# Patient Record
Sex: FEMALE | Race: WHITE | Employment: FULL TIME | URBAN - METROPOLITAN AREA
[De-identification: names, ages, dates, MRNs, and addresses within clinical notes are randomized per-mention and may not be internally consistent; named-entity substitution may affect disease eponyms.]

---

## 2021-08-28 ENCOUNTER — HOSPITAL ENCOUNTER (EMERGENCY)
Age: 21
Discharge: PSYCHIATRIC OR SUBSTANCE USE FACILITY | End: 2021-08-29
Attending: EMERGENCY MEDICINE
Payer: COMMERCIAL

## 2021-08-28 VITALS
HEART RATE: 78 BPM | RESPIRATION RATE: 14 BRPM | WEIGHT: 165 LBS | DIASTOLIC BLOOD PRESSURE: 72 MMHG | SYSTOLIC BLOOD PRESSURE: 113 MMHG | OXYGEN SATURATION: 99 % | TEMPERATURE: 98.2 F

## 2021-08-28 DIAGNOSIS — R45.851 SUICIDAL IDEATION: Primary | ICD-10-CM

## 2021-08-28 LAB
A/G RATIO: 1.4 (ref 1.1–2.2)
ACETAMINOPHEN LEVEL: <5 UG/ML (ref 10–30)
ALBUMIN SERPL-MCNC: 4.8 G/DL (ref 3.4–5)
ALP BLD-CCNC: 98 U/L (ref 40–129)
ALT SERPL-CCNC: 9 U/L (ref 10–40)
AMORPHOUS: ABNORMAL /HPF
AMPHETAMINE SCREEN, URINE: ABNORMAL
ANION GAP SERPL CALCULATED.3IONS-SCNC: 12 MMOL/L (ref 3–16)
AST SERPL-CCNC: 15 U/L (ref 15–37)
BACTERIA: ABNORMAL /HPF
BARBITURATE SCREEN URINE: ABNORMAL
BASOPHILS ABSOLUTE: 0 K/UL (ref 0–0.2)
BASOPHILS RELATIVE PERCENT: 0.4 %
BENZODIAZEPINE SCREEN, URINE: ABNORMAL
BILIRUB SERPL-MCNC: 0.4 MG/DL (ref 0–1)
BILIRUBIN URINE: ABNORMAL
BLOOD, URINE: NEGATIVE
BUN BLDV-MCNC: 11 MG/DL (ref 7–20)
CALCIUM SERPL-MCNC: 9.9 MG/DL (ref 8.3–10.6)
CANNABINOID SCREEN URINE: POSITIVE
CHLORIDE BLD-SCNC: 101 MMOL/L (ref 99–110)
CLARITY: CLEAR
CO2: 23 MMOL/L (ref 21–32)
COCAINE METABOLITE SCREEN URINE: ABNORMAL
COLOR: YELLOW
CREAT SERPL-MCNC: 0.8 MG/DL (ref 0.6–1.1)
EOSINOPHILS ABSOLUTE: 0.1 K/UL (ref 0–0.6)
EOSINOPHILS RELATIVE PERCENT: 1 %
EPITHELIAL CELLS, UA: ABNORMAL /HPF (ref 0–5)
ETHANOL: NORMAL MG/DL (ref 0–0.08)
GFR AFRICAN AMERICAN: >60
GFR NON-AFRICAN AMERICAN: >60
GLOBULIN: 3.4 G/DL
GLUCOSE BLD-MCNC: 90 MG/DL (ref 70–99)
GLUCOSE URINE: NEGATIVE MG/DL
HCG QUALITATIVE: NEGATIVE
HCT VFR BLD CALC: 42.2 % (ref 36–48)
HEMOGLOBIN: 14.3 G/DL (ref 12–16)
KETONES, URINE: ABNORMAL MG/DL
LEUKOCYTE ESTERASE, URINE: ABNORMAL
LYMPHOCYTES ABSOLUTE: 1.6 K/UL (ref 1–5.1)
LYMPHOCYTES RELATIVE PERCENT: 17.7 %
Lab: ABNORMAL
MCH RBC QN AUTO: 29.4 PG (ref 26–34)
MCHC RBC AUTO-ENTMCNC: 33.8 G/DL (ref 31–36)
MCV RBC AUTO: 86.8 FL (ref 80–100)
METHADONE SCREEN, URINE: ABNORMAL
MICROSCOPIC EXAMINATION: YES
MONOCYTES ABSOLUTE: 0.5 K/UL (ref 0–1.3)
MONOCYTES RELATIVE PERCENT: 5.2 %
NEUTROPHILS ABSOLUTE: 6.6 K/UL (ref 1.7–7.7)
NEUTROPHILS RELATIVE PERCENT: 75.7 %
NITRITE, URINE: NEGATIVE
OPIATE SCREEN URINE: ABNORMAL
OXYCODONE URINE: ABNORMAL
PDW BLD-RTO: 13 % (ref 12.4–15.4)
PH UA: 8
PH UA: 8 (ref 5–8)
PHENCYCLIDINE SCREEN URINE: ABNORMAL
PLATELET # BLD: 309 K/UL (ref 135–450)
PMV BLD AUTO: 8.5 FL (ref 5–10.5)
POTASSIUM REFLEX MAGNESIUM: 3.7 MMOL/L (ref 3.5–5.1)
PROPOXYPHENE SCREEN: ABNORMAL
PROTEIN UA: NEGATIVE MG/DL
RBC # BLD: 4.86 M/UL (ref 4–5.2)
RBC UA: ABNORMAL /HPF (ref 0–4)
SALICYLATE, SERUM: <0.3 MG/DL (ref 15–30)
SARS-COV-2, NAAT: NOT DETECTED
SODIUM BLD-SCNC: 136 MMOL/L (ref 136–145)
SPECIFIC GRAVITY UA: 1.02 (ref 1–1.03)
TOTAL PROTEIN: 8.2 G/DL (ref 6.4–8.2)
URINE TYPE: ABNORMAL
UROBILINOGEN, URINE: 2 E.U./DL
WBC # BLD: 8.8 K/UL (ref 4–11)
WBC UA: ABNORMAL /HPF (ref 0–5)

## 2021-08-28 PROCEDURE — 80307 DRUG TEST PRSMV CHEM ANLYZR: CPT

## 2021-08-28 PROCEDURE — 6370000000 HC RX 637 (ALT 250 FOR IP): Performed by: PHYSICIAN ASSISTANT

## 2021-08-28 PROCEDURE — 81001 URINALYSIS AUTO W/SCOPE: CPT

## 2021-08-28 PROCEDURE — 84703 CHORIONIC GONADOTROPIN ASSAY: CPT

## 2021-08-28 PROCEDURE — 80143 DRUG ASSAY ACETAMINOPHEN: CPT

## 2021-08-28 PROCEDURE — 80179 DRUG ASSAY SALICYLATE: CPT

## 2021-08-28 PROCEDURE — 80053 COMPREHEN METABOLIC PANEL: CPT

## 2021-08-28 PROCEDURE — 82077 ASSAY SPEC XCP UR&BREATH IA: CPT

## 2021-08-28 PROCEDURE — 99285 EMERGENCY DEPT VISIT HI MDM: CPT

## 2021-08-28 PROCEDURE — 85025 COMPLETE CBC W/AUTO DIFF WBC: CPT

## 2021-08-28 PROCEDURE — 87635 SARS-COV-2 COVID-19 AMP PRB: CPT

## 2021-08-28 RX ORDER — NICOTINE 21 MG/24HR
1 PATCH, TRANSDERMAL 24 HOURS TRANSDERMAL DAILY
Status: DISCONTINUED | OUTPATIENT
Start: 2021-08-28 | End: 2021-08-29 | Stop reason: HOSPADM

## 2021-08-28 RX ORDER — NICOTINE 21 MG/24HR
1 PATCH, TRANSDERMAL 24 HOURS TRANSDERMAL DAILY
Status: DISCONTINUED | OUTPATIENT
Start: 2021-08-29 | End: 2021-08-28

## 2021-08-28 NOTE — ED NOTES
Presenting Problem:Patient presents to the ED at [facility] voluntarily after having thoughts of overdosing on sleeping pills. Patient was clinically sober at the time of the evaluation. Patient states she was medically discharged from the service in 12/2020 for a hip fracture. She returned home to [place] where her parents are living. A few months ago she moved back to this area, where her friends are. Patient states she stopped going to therapy about a year ago because she did not feel it was going any good. She states she continues to take her medication that is prescribed from a doctor in [place]. She state recently she started having suicidal thoughts. Last night the thoughts where very intense and she considered taking an overdose of over the counter sleeping pills. She states she does not feel safe at home, she is afraid she may do something, which is why she had her friend bring her to the hospital.  She states she does have a relationship with her parents but it is strained because they do not agree with her sexuality. She states she feels numb. She feels as though everything is her fault and she cant do anything right. \"Whatever I do it isn't good enough, everything and everybody would be better off with out me. \"      Appearance/Hygiene:  hospital attire, fair grooming and fair hygiene   Motor Behavior: WNL   Attitude: cooperative  Affect: flat affect   Speech: normal volume  Mood: sad   Thought Processes: [place]  Perceptions: Absent   Thought content: Clear   Orientation: A&Ox4   Memory: intact  Concentration: Fair    Insight/ judgement: impaired judgment and insight    Psychosocial and contextual factors: Pt recently out of the service in 12/2020 for fractured hip. Lives alone. C-SSRS flowsheet is  Complete. Psychiatric History (including current outpatient provider and past inpatient admissions): Yes. Has not seen anyone in about a year.   She did not feel as though it was doing any good. States that she continues to take her medications.      Access to Firearms: Knives    ASSESSMENT FOR IMMINENT FUTURE DANGER:    RISK FACTORS:    [x]  Age <25 or >49   []  Male gender   [x]  Depressed mood   [x]  Active suicidal ideation   [x]  Suicide plan   []  Suicide attempt   []  Access to lethal means   []  Prior suicide attempt   []  Active substance abuse    [x]  Highly impulsive behaviors   []  Not attending to self-care/ADLs    []  Recent significant loss   []  Chronic pain or medical illness   [x]  Social isolation   []  History of violence    []  Active psychosis   []  Cognitive impairment    [x]  No outpatient services in place   []  Medication noncompliance   [x]  No collateral information to support safety  [x] Self- injurious/ Self-harm behavior    PROTECTIVE FACTORS:  [] Age >25 and <55  [x] Female gender   [] Denies depression  [] Denies suicidal ideation  [] Does not have lethal plan   [] Does not have access to guns or weapons  [] Patient is verbally queenie for safety  [x] No prior suicide attempts  [] No active substance abuse  [] Patient has social or family support  [] No active psychosis or cognitive dysfunction  [x] Physically healthy  [] Has outpatient services in place  [] Compliant with recommended medications           Radha Lorenzo RN  08/28/21 7116

## 2021-08-28 NOTE — ED NOTES
Collateral Contact:  Name: Koki Rios  Relation to Patient: Friend  Last Contact with Patient: This morning  Concerns:     July Sorto states that she has known the patient for about 2 years. All she knows is what the patient has told her. That the patient is not in a good place right now. The patient has a history of self harm behavior. She does see her a couple times a week. They used to work together and she is friends with the patients GF. July Sorto does know that the relationship between the patient and her parents is not good because of the patients sexuality. July Sorto does not know if the patient would really harm herself or not.          Rodolfo Lebron RN  08/28/21 1683

## 2021-08-28 NOTE — ED NOTES
Patient belonging:shirt. shorts,and bra,scok and shoes. Wallet with 5 cards and id.  Cell phone     María Chris  08/28/21 4853

## 2021-08-28 NOTE — ED PROVIDER NOTES
[facility] Emergency Department    CHIEF COMPLAINT  Suicidal (pt sts \" i feel i am a threat to myself\" started a week ago)      [address] [facility]  Patient was evaluated by myself as well as the attending physician Dr. Ian Coffey. HISTORY OF PRESENT ILLNESS  Jeronimo West is a 22 y.o. female history of depression anxiety presents emergency department for evaluation of concerns for for being a threat to herself. Patient has a long history of depression anxiety and takes citalopram.  She has been seen by therapy in the past however has not been seen in over a year. Reports over the past week her anxiety depression has been increasing and she has lost interest in daily activities. Patient states that she lives alone. Patient states that she has thought of harm herself if she would do so she would take a large amount of sleeping pills. No prior suicide attempts in the past.  Has not attempted at this time. Denies any prior inpatient psychiatric evaluation. Denies any medical conditions aside from eczema. Denies drug or alcohol use. Admits to vaping. No other complaints, modifying factors or associated symptoms. Nursing notes reviewed. History reviewed. No pertinent past medical history. History reviewed. No pertinent surgical history. History reviewed. No pertinent family history.   Social History     Socioeconomic History    Marital status: Single     Spouse name: Not on file    Number of children: Not on file    Years of education: Not on file    Highest education level: Not on file   Occupational History    Not on file   Tobacco Use    Smoking status: Never Smoker    Smokeless tobacco: Never Used   Vaping Use    Vaping Use: Every day    Substances: Nicotine   Substance and Sexual Activity    Alcohol use: Not on file    Drug use: Not on file    Sexual activity: Not on file   Other Topics Concern    Not on file   Social History Narrative    Not on file Social Determinants of Health     Financial Resource Strain:     Difficulty of Paying Living Expenses:    Food Insecurity:     Worried About Running Out of Food in the Last Year:     [address] in the Last Year:    Transportation Needs:     Lack of Transportation (Medical):  Lack of Transportation (Non-Medical):    Physical Activity:     Days of Exercise per Week:     Minutes of Exercise per Session:    Stress:     Feeling of Stress :    Social Connections:     Frequency of Communication with Friends and Family:     Frequency of Social Gatherings with Friends and Family:     Attends Zoroastrian Services:     Active Member of Clubs or Organizations:     Attends Club or Organization Meetings:     Marital Status:    Intimate Partner Violence:     Fear of Current or Ex-Partner:     Emotionally Abused:     Physically Abused:     Sexually Abused:      Current Facility-Administered Medications   Medication Dose Route Frequency Provider Last Rate Last Admin    nicotine (NICODERM CQ) 21 MG/24HR 1 patch  1 patch Transdermal Daily Amilcar Logan PA-C   1 patch at 08/28/21 2234     No current outpatient medications on file. Allergies   Allergen Reactions    Pcn [Penicillins]        REVIEW OF SYSTEMS  10 systems reviewed, pertinent positives per HPI otherwise noted to be negative    PHYSICAL EXAM  /72   Pulse 78   Temp 98.2 °F (36.8 °C) (Oral)   Resp 14   Wt 165 lb (74.8 kg)   LMP 08/05/2021   SpO2 99%   GENERAL APPEARANCE: Awake and alert. Cooperative. HEAD: Normocephalic. Atraumatic. EYES: PERRL. EOM's grossly intact. ENT: Mucous membranes are moist.   NECK: Supple. HEART: RRR. No murmurs. LUNGS: Respirations unlabored. CTAB. Good air exchange. Speaking comfortably in full sentences. ABDOMEN: Soft. Non-distended. Non-tender. No guarding or rebound. No masses. No organomegaly. EXTREMITIES: No peripheral edema. Moves all extremities equally.  All extremities neurovascularly intact. SKIN: Warm and dry. No acute rashes. NEUROLOGICAL: Alert and oriented. CN's 2-12 intact. No gross facial drooping. Strength 5/5, sensation intact. PSYCHIATRIC: Normal mood and affect.     RADIOLOGY      LABS  Labs Reviewed   COMPREHENSIVE METABOLIC PANEL W/ REFLEX TO MG FOR LOW K - Abnormal; Notable for the following components:       Result Value    ALT 9 (*)     All other components within normal limits    Narrative:     Performed at:  [address], [facility]   Phone [phone] LEVEL - Abnormal; Notable for the following components:    Acetaminophen Level <5 (*)     All other components within normal limits    Narrative:     Performed at:  [place] [address] [facility]   Phone [phone]   SALICYLATE LEVEL - Abnormal; Notable for the following components:    Salicylate, Serum <2.1 (*)     All other components within normal limits    Narrative:     Performed at:  [address], [facility]   Phone [phone]   [address] - Abnormal; Notable for the following components:    Cannabinoid Scrn, Ur POSITIVE (*)     All other components within normal limits    Narrative:     Performed at:  [address], [facility]   Phone [phone] - Abnormal; Notable for the following components:    Bilirubin Urine SMALL (*)     Ketones, Urine TRACE (*)     Urobilinogen, Urine 2.0 (*)     Leukocyte Esterase, Urine TRACE (*)     All other components within normal limits    Narrative:     Performed at:  [place] [address] [facility]   Phone [phone]   MICROSCOPIC URINALYSIS - Abnormal; Notable for the following components:    WBC, UA 6-9 (*)     Bacteria, UA Rare (*) All other components within normal limits    Narrative:     Performed at:  [address], [facility] [place]   Phone [phone], RAPID    Narrative:     Performed at:  [address], [facility] [place]   Phone [phone]   CBC WITH AUTO DIFFERENTIAL    Narrative:     Performed at:  [address], [facility] [place]   Phone [phone]   ETHANOL    Narrative:     Performed at:  [address], [facility] [place]   Phone [phone]   HCG, SERUM, QUALITATIVE    Narrative:     Performed at:  [address], [facility] [place]   Phone [phone]       PROCEDURES  Unless otherwise noted below, none  Procedures    ED COURSE    ED Course as of Aug 29 0100   Sat Aug 28, 2021   1548 Patient medically cleared ready for telepsych evaluation. [MM]      ED Course User Index  [MM] Narinder Cruz PA-C       CRITICAL CARE TIME      MDM  MDM  31-year-old female history anxiety depression presents emergency department for evaluation of thoughts concerns of being a threat against herself. Currently takes antidepressants. No current established psychiatric or therapy. No prior history of inpatient evaluations. No prior suicide attempts. Patient states she has considered taking sleeping pills as a way of overdose. No attempts at this time. On arrival vitals normal limits. Patient is alert oriented cooperative and does not appear to be intoxicated. Denies history of drug or alcohol abuse. At this time patient appears medically cleared we will reach out to behavioral health for telemetry psych evaluation to discuss plan for disposition.     Patient was evaluated by behavioral health and per the recommendation a 72-hour hold signed and patient will be transferred to [facility] behavioral health for inpatient evaluation. DISPOSITION  Transfer to [facility] behavioral health for inpatient evaluation and management. CLINICAL IMPRESSION  1.  Suicidal ideation           Carlita Sen PA-C  08/29/21 0106

## 2021-08-28 NOTE — ED NOTES
Level of Care Disposition:  Admit    Patient was seen by ED provider and [facility] staff. The case presented to psychiatric provider on-call Dr. Katy Donald. Based on the ED evaluation and information presented to the provider by Sheron Feliciano,  it is the recommendation of the on call psychiatric provider that inpatient hospitalization is the least restrictive environment for the patient at this time. The patient will be admitted to the inpatient unit. Admitting provider did not order suicide precautions based on the unit is a safe and secure environment. RATIONALE FOR ADMISSION:   Patient at imminent risk of danger to self as demonstrated by her desire to commit suicide with plan.                 Teresa Puente RN  08/28/21 2635

## 2021-08-29 PROCEDURE — 6370000000 HC RX 637 (ALT 250 FOR IP): Performed by: EMERGENCY MEDICINE

## 2021-08-29 RX ORDER — LORAZEPAM 1 MG/1
1 TABLET ORAL ONCE
Status: COMPLETED | OUTPATIENT
Start: 2021-08-29 | End: 2021-08-29

## 2021-08-29 RX ADMIN — LORAZEPAM 1 MG: 1 TABLET ORAL at 00:22

## 2021-08-29 NOTE — ED NOTES
Misty RN called stating she can get transport @ 0100 with [facility] instead of [address]. Called [facility] to cancel transport.      Anjana Gilbert  08/28/21 3147

## 2021-08-29 NOTE — ED NOTES
1705 - called TelePsych per SILVIANO Valdez and spoke with Gudelia at [facility]. They will review chart and call us back  Re: Suicidal thoughts  7802 - [facility] called to notify that they are ready to TelePsych pt  [address] RN called to speak to ED RN  [address], spoke to [facility] and was told cant start transfer until rapid covid test is resulted. 1945- Covid test Negative, called [facility], spoke to [facility]  Per [address] [facility] is full and has no beds available. [facility] Fax machine is also down so we would have to put a packet together to fax to different facilities. 2011Germain Goldsmith RN called statting [facility] has accepted patient pending Labs. Faxed patient packet over to [phone] @2040 Annetta Crespo RN aware. 2150- [facility] center for update, spoke to [facility] RN was told [facility] was reviewing the patients packet. 2213Germain Goldsmith RN called with Alden Harvey from [facility] to speak to ED RN Silverio Dodson.   2220- Patient accepted by [facility]  6527- [facility] scheduled for 0900 report number [phone]       [facility]  08/28/21 0215

## 2021-08-29 NOTE — ED NOTES
[facility] arrived at [address] to transport patient to [facility]. Gave report to transport and patient left with all personal belongings that were given to transport personnel. Pt had already called mother to update with transport information.        Esperanza Arzate RN  08/29/21 6393

## 2021-08-29 NOTE — ED NOTES
Spoke with Sana Esquivel at [facility] who reports the admitting at [facility] is Dr. Elena Tapia. Sana Esquivel asked us to refax the \"pink\" slip and we are to arrange for transport.        Elizabeth Grimm RN  08/28/21 5343

## 2021-08-29 NOTE — ED NOTES
Called [facility] nurse report line and gave report to [facility] RTERESA Cotto RN  08/29/21 7705

## 2021-08-29 NOTE — ED PROVIDER NOTES
I independently examined and evaluated Travis Angeles. All diagnostic, treatment, and disposition decisions were made by myself in conjunction with the DIONICIO, Claudio Kapoor. For all further details of the patient's emergency department visit, please see their documentation. 25-year-old female with history of depression presents with suicidal thoughts. She states her depression has been flared up recently and she has been having thoughts of overdosing on her sleeping pills. She states one time years ago she did try to kill her self by drowning herself. She used to see a psychiatrist and is on citalopram but she feels like it is not helping anymore. Vitals:    08/28/21 1632   BP: 111/74   Pulse: 84   Resp: 16   Temp: 97.9 °F (36.6 °C)   SpO2: 99%     Here she is in no acute distress. Heart is regular rate and rhythm, no respiratory distress. She does have suicidal ideation with a plan.     Results for orders placed or performed during the hospital encounter of 08/28/21   COVID-19, Rapid    Specimen: Nasopharyngeal Swab   Result Value Ref Range    SARS-CoV-2, NAAT Not Detected Not Detected   CBC Auto Differential   Result Value Ref Range    WBC 8.8 4.0 - 11.0 K/uL    RBC 4.86 4.00 - 5.20 M/uL    Hemoglobin 14.3 12.0 - 16.0 g/dL    Hematocrit 42.2 36.0 - 48.0 %    MCV 86.8 80.0 - 100.0 fL    MCH 29.4 26.0 - 34.0 pg    MCHC 33.8 31.0 - 36.0 g/dL    RDW 13.0 12.4 - 15.4 %    Platelets 631 061 - 624 K/uL    MPV 8.5 5.0 - 10.5 fL    Neutrophils % 75.7 %    Lymphocytes % 17.7 %    Monocytes % 5.2 %    Eosinophils % 1.0 %    Basophils % 0.4 %    Neutrophils Absolute 6.6 1.7 - 7.7 K/uL    Lymphocytes Absolute 1.6 1.0 - 5.1 K/uL    Monocytes Absolute 0.5 0.0 - 1.3 K/uL    Eosinophils Absolute 0.1 0.0 - 0.6 K/uL    Basophils Absolute 0.0 0.0 - 0.2 K/uL   Comprehensive Metabolic Panel w/ Reflex to MG   Result Value Ref Range    Sodium 136 136 - 145 mmol/L    Potassium reflex Magnesium 3.7 3.5 - 5.1 mmol/L    Chloride 101 99 - 110 mmol/L    CO2 23 21 - 32 mmol/L    Anion Gap 12 3 - 16    Glucose 90 70 - 99 mg/dL    BUN 11 7 - 20 mg/dL    CREATININE 0.8 0.6 - 1.1 mg/dL    GFR Non-African American >60 >60    GFR African American >60 >60    Calcium 9.9 8.3 - 10.6 mg/dL    Total Protein 8.2 6.4 - 8.2 g/dL    Albumin 4.8 3.4 - 5.0 g/dL    Albumin/Globulin Ratio 1.4 1.1 - 2.2    Total Bilirubin 0.4 0.0 - 1.0 mg/dL    Alkaline Phosphatase 98 40 - 129 U/L    ALT 9 (L) 10 - 40 U/L    AST 15 15 - 37 U/L    Globulin 3.4 g/dL   Ethanol   Result Value Ref Range    Ethanol Lvl None Detected mg/dL   Acetaminophen level   Result Value Ref Range    Acetaminophen Level <5 (L) 10 - 30 ug/mL   Salicylate   Result Value Ref Range    Salicylate, Serum <1.5 (L) 15.0 - 30.0 mg/dL   Urine Drug Screen   Result Value Ref Range    Amphetamine Screen, Urine Neg Negative <1000ng/mL    Barbiturate Screen, Ur Neg Negative <200 ng/mL    Benzodiazepine Screen, Urine Neg Negative <200 ng/mL    Cannabinoid Scrn, Ur POSITIVE (A) Negative <50 ng/mL    Cocaine Metabolite Screen, Urine Neg Negative <300 ng/mL    Opiate Scrn, Ur Neg Negative <300 ng/mL    PCP Screen, Urine Neg Negative <25 ng/mL    Methadone Screen, Urine Neg Negative <300 ng/mL    Propoxyphene Scrn, Ur Neg Negative <300 ng/mL    Oxycodone Urine Neg Negative <100 ng/ml    pH, UA 8.0     Drug Screen Comment: see below    Urinalysis, reflex to microscopic   Result Value Ref Range    Color, UA Yellow Straw/Yellow    Clarity, UA Clear Clear    Glucose, Ur Negative Negative mg/dL    Bilirubin Urine SMALL (A) Negative    Ketones, Urine TRACE (A) Negative mg/dL    Specific Gravity, UA 1.020 1.005 - 1.030    Blood, Urine Negative Negative    pH, UA 8.0 5.0 - 8.0    Protein, UA Negative Negative mg/dL    Urobilinogen, Urine 2.0 (A) <2.0 E.U./dL    Nitrite, Urine Negative Negative    Leukocyte Esterase, Urine TRACE (A) Negative    Microscopic Examination YES     Urine Type NotGiven    HCG Qualitative, Serum   Result Value Ref Range    hCG Qual Negative Detects HCG level >10 MIU/mL   Microscopic Urinalysis   Result Value Ref Range    WBC, UA 6-9 (A) 0 - 5 /HPF    RBC, UA 0-2 0 - 4 /HPF    Epithelial Cells, UA 2-5 0 - 5 /HPF    Bacteria, UA Rare (A) None Seen /HPF    Amorphous, UA Rare /HPF         No orders to display     Here the patient's lab work is unremarkable. She has been medically cleared. Covid test is negative. She has been placed on involuntary commitment paperwork and will be transferred to [facility] for psychiatric admission.        Annie Thomason MD  08/29/21 4233